# Patient Record
Sex: FEMALE
[De-identification: names, ages, dates, MRNs, and addresses within clinical notes are randomized per-mention and may not be internally consistent; named-entity substitution may affect disease eponyms.]

---

## 2020-12-10 ENCOUNTER — APPOINTMENT (OUTPATIENT)
Dept: PHYSICAL MEDICINE AND REHAB | Facility: CLINIC | Age: 25
End: 2020-12-10
Payer: COMMERCIAL

## 2020-12-10 VITALS — BODY MASS INDEX: 18.36 KG/M2 | HEIGHT: 67 IN | WEIGHT: 117 LBS

## 2020-12-10 VITALS — RESPIRATION RATE: 16 BRPM

## 2020-12-10 DIAGNOSIS — M54.2 CERVICALGIA: ICD-10-CM

## 2020-12-10 DIAGNOSIS — Z20.828 CONTACT WITH AND (SUSPECTED) EXPOSURE TO OTHER VIRAL COMMUNICABLE DISEASES: ICD-10-CM
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
## 2020-12-10 PROBLEM — Z00.00 ENCOUNTER FOR PREVENTIVE HEALTH EXAMINATION: Status: ACTIVE | Noted: 2020-12-10

## 2020-12-10 PROCEDURE — 99204 OFFICE O/P NEW MOD 45 MIN: CPT | Mod: CR,95

## 2020-12-10 NOTE — ASSESSMENT
[FreeTextEntry1] : \par PLAN AND RECOMMENDATIONS :\par \par We discussed differential diagnosis and clinical impression\par we talked about ergonomics \par conservative RX \par Recommend\par .symptomatic care and support\par  medications NSAIDS as needed -  OTC fine (-personal preference )-(once or twice a day), -warned of  possible GI side effects -advised to take with meals or add over the counter Nexium, if sensitive\par \par  imaging not needed \par instructed in self  stretches \par  hydrotherapy /heat / cold for pain\par  continue  ergonomic precautions including pacing ,posture and frequent breaks while typing.\par \par \par  relative rest and avoidance of painful activity where possible \par  increasing activity as discussed \par  return for follow up prn \par will give covid test as room mates sister tested + she is worried \par

## 2020-12-10 NOTE — REVIEW OF SYSTEMS
[Patient Intake Form Reviewed] : Patient intake form was reviewed [Muscle Pain] : muscle pain [Negative] : Heme/Lymph [Fever] : no fever [Fatigue] : no fatigue [Lower Ext Edema] : no lower extremity edema

## 2020-12-10 NOTE — PHYSICAL EXAM
[Normal] : Oriented to person, place, and time, insight and judgement were intact and the affect was normal [de-identified] : stiff in turning  [de-identified] : RR 16  [de-identified] : tender traps  [de-identified] : gait normal no kathia

## 2020-12-10 NOTE — HISTORY OF PRESENT ILLNESS
[Home] : at home, [unfilled] , at the time of the visit. [Medical Office: ([facility])___] : at the medical office located in  [Verbal consent obtained from patient] : the patient, [unfilled] [FreeTextEntry1] : Ms. JIMMIE MAGALLANES is a very pleasant 25 year female seen for evaluation of neck pain that has been ongoing for  5 days   without any specific injury or inciting event. The pain is located primarily [post neck and traps intermittent in nature and described as stiff  . The pain is rated as 2 /10 during today's visit, and ranges from 2-5/10. The patient's symptoms are aggravated by turning head  and alleviated by [heat . The patient denies any radiating symptoms to the upper extremities, numbness/tingling, weakness, or bowel/bladder dysfunction. The patient has no other complaints at this time.\par she is working for [employer]

## 2020-12-14 LAB — SARS-COV-2 N GENE NPH QL NAA+PROBE: NOT DETECTED

## 2021-02-04 LAB — SARS-COV-2 N GENE NPH QL NAA+PROBE: NOT DETECTED
